# Patient Record
Sex: FEMALE | Race: WHITE
[De-identification: names, ages, dates, MRNs, and addresses within clinical notes are randomized per-mention and may not be internally consistent; named-entity substitution may affect disease eponyms.]

---

## 2020-08-01 ENCOUNTER — HOSPITAL ENCOUNTER (INPATIENT)
Dept: HOSPITAL 54 - ER | Age: 65
LOS: 2 days | Discharge: HOME | DRG: 198 | End: 2020-08-03
Attending: INTERNAL MEDICINE
Payer: MEDICAID

## 2020-08-01 VITALS — SYSTOLIC BLOOD PRESSURE: 129 MMHG | DIASTOLIC BLOOD PRESSURE: 61 MMHG

## 2020-08-01 VITALS — HEIGHT: 60.5 IN | BODY MASS INDEX: 38.83 KG/M2 | WEIGHT: 203 LBS

## 2020-08-01 VITALS — DIASTOLIC BLOOD PRESSURE: 61 MMHG | SYSTOLIC BLOOD PRESSURE: 129 MMHG

## 2020-08-01 DIAGNOSIS — R53.1: ICD-10-CM

## 2020-08-01 DIAGNOSIS — N39.0: ICD-10-CM

## 2020-08-01 DIAGNOSIS — K11.7: ICD-10-CM

## 2020-08-01 DIAGNOSIS — Z87.11: ICD-10-CM

## 2020-08-01 DIAGNOSIS — J45.909: ICD-10-CM

## 2020-08-01 DIAGNOSIS — K21.9: ICD-10-CM

## 2020-08-01 DIAGNOSIS — G40.909: ICD-10-CM

## 2020-08-01 DIAGNOSIS — Z87.820: ICD-10-CM

## 2020-08-01 DIAGNOSIS — F41.9: ICD-10-CM

## 2020-08-01 DIAGNOSIS — G89.29: ICD-10-CM

## 2020-08-01 DIAGNOSIS — Z95.5: ICD-10-CM

## 2020-08-01 DIAGNOSIS — R07.89: Primary | ICD-10-CM

## 2020-08-01 DIAGNOSIS — Z79.899: ICD-10-CM

## 2020-08-01 DIAGNOSIS — E66.9: ICD-10-CM

## 2020-08-01 DIAGNOSIS — E78.5: ICD-10-CM

## 2020-08-01 DIAGNOSIS — M32.9: ICD-10-CM

## 2020-08-01 DIAGNOSIS — M79.7: ICD-10-CM

## 2020-08-01 DIAGNOSIS — F32.9: ICD-10-CM

## 2020-08-01 DIAGNOSIS — D64.9: ICD-10-CM

## 2020-08-01 DIAGNOSIS — I25.10: ICD-10-CM

## 2020-08-01 LAB
ALBUMIN SERPL BCP-MCNC: 3.4 G/DL (ref 3.4–5)
ALP SERPL-CCNC: 110 U/L (ref 46–116)
ALT SERPL W P-5'-P-CCNC: 21 U/L (ref 12–78)
APPEARANCE UR: (no result)
AST SERPL W P-5'-P-CCNC: 15 U/L (ref 15–37)
BASOPHILS # BLD AUTO: 0 /CMM (ref 0–0.2)
BASOPHILS NFR BLD AUTO: 0.4 % (ref 0–2)
BILIRUB DIRECT SERPL-MCNC: 0.1 MG/DL (ref 0–0.2)
BILIRUB SERPL-MCNC: 0.4 MG/DL (ref 0.2–1)
BUN SERPL-MCNC: 13 MG/DL (ref 7–18)
CALCIUM SERPL-MCNC: 9.5 MG/DL (ref 8.5–10.1)
CHLORIDE SERPL-SCNC: 103 MMOL/L (ref 98–107)
CO2 SERPL-SCNC: 29 MMOL/L (ref 21–32)
CREAT SERPL-MCNC: 1.1 MG/DL (ref 0.6–1.3)
EOSINOPHIL NFR BLD AUTO: 1 % (ref 0–6)
GLUCOSE SERPL-MCNC: 91 MG/DL (ref 74–106)
HCT VFR BLD AUTO: 38 % (ref 33–45)
HGB BLD-MCNC: 12.4 G/DL (ref 11.5–14.8)
LIPASE SERPL-CCNC: 84 U/L (ref 73–393)
LYMPHOCYTES NFR BLD AUTO: 2.5 /CMM (ref 0.8–4.8)
LYMPHOCYTES NFR BLD AUTO: 22.1 % (ref 20–44)
MCHC RBC AUTO-ENTMCNC: 33 G/DL (ref 31–36)
MCV RBC AUTO: 91 FL (ref 82–100)
MONOCYTES NFR BLD AUTO: 0.8 /CMM (ref 0.1–1.3)
MONOCYTES NFR BLD AUTO: 7.4 % (ref 2–12)
NEUTROPHILS # BLD AUTO: 7.9 /CMM (ref 1.8–8.9)
NEUTROPHILS NFR BLD AUTO: 69.1 % (ref 43–81)
PH UR STRIP: 5 [PH] (ref 5–8)
PLATELET # BLD AUTO: 263 /CMM (ref 150–450)
POTASSIUM SERPL-SCNC: 3.8 MMOL/L (ref 3.5–5.1)
PROT SERPL-MCNC: 7.2 G/DL (ref 6.4–8.2)
RBC # BLD AUTO: 4.12 MIL/UL (ref 4–5.2)
RBC #/AREA URNS HPF: (no result) /HPF (ref 0–2)
SODIUM SERPL-SCNC: 140 MMOL/L (ref 136–145)
UROBILINOGEN UR STRIP-MCNC: 0.2 EU/DL
WBC #/AREA URNS HPF: (no result) /HPF (ref 0–3)
WBC NRBC COR # BLD AUTO: 11.4 K/UL (ref 4.3–11)

## 2020-08-01 PROCEDURE — G0378 HOSPITAL OBSERVATION PER HR: HCPCS

## 2020-08-01 NOTE — NUR
BIBS FROM HOME TO ER BED 12. AAOX4. NOT IN RESP DISTRESS, AMBUALTORY. CAME IN 
FOR L CHEST PAIN SINCE TUESDAY AND NAUSEA W/ VOMMITING SINCE MONDAY. PER PT, 
SHE MIGHT HAVE HAD FOOD POISONING LAST MONDAY AND HAVE BEEN NAUSEOUS AND 
VOMMITING. PT ALSO VERBALIZED THAT SHE HAD CHEST PAIN SINCE TUESDAY 4/10 [place] 
NON RADIATING. MICHI GIBSON WAS AT THE BEDSIDE FOR EVAL. ORDERS RECEIVED NOTED 
AND CARRIED OUT. IV LINE ON R HAND 20GH. BLOO DRAWNAND SENT TO LAB.

## 2020-08-01 NOTE — NUR
PT WAS TRANSPORTED TO UNIT ON [facility] WITH EMT AND RN AT BEDSIDE W/ ACLS 
PROTOCOL. NAD NOTED DURING TRANSPORT. PT AMBULATED WITH SBA USING A CANE.

## 2020-08-01 NOTE — NUR
PATIENT ARRIVED FROM ER, AWAKE, A/O X4. NO SOB NOTED. NO COMPLAIN OF CHEST PAIN. NO NAUSEA 
OR VOMITTING. CALL LIGHT WIHTIN REACH. BED IN LOWEST AND LOCKED POSITION. INSTRUCTED TO CALL 
FOR ASSISTANCE WHEN OOB, PATIENT VERBALIZED UNDERSTANDING. WITH RIGHT HAND IV INTACT. 
INFORMED MD - DR GOTTLIEB OF ADMISSION, WAITING FOR THE RESPONSE.

## 2020-08-02 VITALS — DIASTOLIC BLOOD PRESSURE: 81 MMHG | SYSTOLIC BLOOD PRESSURE: 118 MMHG

## 2020-08-02 VITALS — DIASTOLIC BLOOD PRESSURE: 58 MMHG | SYSTOLIC BLOOD PRESSURE: 120 MMHG

## 2020-08-02 VITALS — DIASTOLIC BLOOD PRESSURE: 70 MMHG | SYSTOLIC BLOOD PRESSURE: 134 MMHG

## 2020-08-02 VITALS — SYSTOLIC BLOOD PRESSURE: 120 MMHG | DIASTOLIC BLOOD PRESSURE: 58 MMHG

## 2020-08-02 VITALS — DIASTOLIC BLOOD PRESSURE: 72 MMHG | SYSTOLIC BLOOD PRESSURE: 144 MMHG

## 2020-08-02 LAB
ALBUMIN SERPL BCP-MCNC: 3 G/DL (ref 3.4–5)
ALP SERPL-CCNC: 88 U/L (ref 46–116)
ALT SERPL W P-5'-P-CCNC: 19 U/L (ref 12–78)
AST SERPL W P-5'-P-CCNC: 13 U/L (ref 15–37)
BASOPHILS # BLD AUTO: 0 /CMM (ref 0–0.2)
BASOPHILS NFR BLD AUTO: 0.3 % (ref 0–2)
BILIRUB SERPL-MCNC: 0.3 MG/DL (ref 0.2–1)
BUN SERPL-MCNC: 13 MG/DL (ref 7–18)
CALCIUM SERPL-MCNC: 8.1 MG/DL (ref 8.5–10.1)
CHLORIDE SERPL-SCNC: 103 MMOL/L (ref 98–107)
CHOLEST SERPL-MCNC: 110 MG/DL (ref ?–200)
CO2 SERPL-SCNC: 29 MMOL/L (ref 21–32)
CREAT SERPL-MCNC: 1 MG/DL (ref 0.6–1.3)
EOSINOPHIL NFR BLD AUTO: 2 % (ref 0–6)
FERRITIN SERPL-MCNC: 26 NG/ML (ref 8–388)
GLUCOSE SERPL-MCNC: 128 MG/DL (ref 74–106)
HCT VFR BLD AUTO: 36 % (ref 33–45)
HDLC SERPL-MCNC: 47 MG/DL (ref 40–60)
HGB BLD-MCNC: 11.8 G/DL (ref 11.5–14.8)
IRON SERPL-MCNC: 47 UG/DL (ref 50–175)
LDLC SERPL DIRECT ASSAY-MCNC: 41 MG/DL (ref 0–99)
LYMPHOCYTES NFR BLD AUTO: 29.8 % (ref 20–44)
LYMPHOCYTES NFR BLD AUTO: 3.1 /CMM (ref 0.8–4.8)
MAGNESIUM SERPL-MCNC: 2.3 MG/DL (ref 1.8–2.4)
MCHC RBC AUTO-ENTMCNC: 33 G/DL (ref 31–36)
MCV RBC AUTO: 92 FL (ref 82–100)
MONOCYTES NFR BLD AUTO: 0.7 /CMM (ref 0.1–1.3)
MONOCYTES NFR BLD AUTO: 6.9 % (ref 2–12)
NEUTROPHILS # BLD AUTO: 6.3 /CMM (ref 1.8–8.9)
NEUTROPHILS NFR BLD AUTO: 61 % (ref 43–81)
PHOSPHATE SERPL-MCNC: 4.6 MG/DL (ref 2.5–4.9)
PLATELET # BLD AUTO: 246 /CMM (ref 150–450)
POTASSIUM SERPL-SCNC: 3.5 MMOL/L (ref 3.5–5.1)
PROT SERPL-MCNC: 6.5 G/DL (ref 6.4–8.2)
RBC # BLD AUTO: 3.88 MIL/UL (ref 4–5.2)
SODIUM SERPL-SCNC: 141 MMOL/L (ref 136–145)
TIBC SERPL-MCNC: 267 UG/DL (ref 250–450)
TRIGL SERPL-MCNC: 128 MG/DL (ref 30–150)
TSH SERPL DL<=0.005 MIU/L-ACNC: 3.15 UIU/ML (ref 0.36–3.74)
WBC NRBC COR # BLD AUTO: 10.3 K/UL (ref 4.3–11)

## 2020-08-02 RX ADMIN — Medication SCH MG: at 09:05

## 2020-08-02 RX ADMIN — GABAPENTIN SCH MG: 400 CAPSULE ORAL at 13:00

## 2020-08-02 RX ADMIN — Medication SCH MG: at 09:13

## 2020-08-02 RX ADMIN — GABAPENTIN SCH MG: 400 CAPSULE ORAL at 09:05

## 2020-08-02 RX ADMIN — ENOXAPARIN SODIUM SCH MG: 40 INJECTION SUBCUTANEOUS at 09:14

## 2020-08-02 RX ADMIN — DEXTROSE MONOHYDRATE SCH MLS/HR: 50 INJECTION, SOLUTION INTRAVENOUS at 08:50

## 2020-08-02 RX ADMIN — CELECOXIB SCH MG: 100 CAPSULE ORAL at 09:05

## 2020-08-02 RX ADMIN — GABAPENTIN SCH MG: 400 CAPSULE ORAL at 16:08

## 2020-08-02 RX ADMIN — RANOLAZINE SCH MG: 500 TABLET, FILM COATED, EXTENDED RELEASE ORAL at 09:13

## 2020-08-02 RX ADMIN — PANTOPRAZOLE SODIUM SCH MG: 40 TABLET, DELAYED RELEASE ORAL at 08:17

## 2020-08-02 RX ADMIN — DULOXETINE HYDROCHLORIDE SCH MG: 30 CAPSULE, DELAYED RELEASE ORAL at 09:07

## 2020-08-02 NOTE — NUR
RN CLOSING NOTES

WILL ENDORSE PATIENT TO PM NURSE. PATIENT  IN BED, A/O X 4, ON ROOM AIR,,NO S/S OF 
RESPIRATORY DISTRESS. IV ACCESS RIGHT HAND # 20 SL AND LEFT WRIST # 18, PATIENT AND FLUSHED. 
PATIENT WAITING ON CT OF HEART WILL BE NPO AFTER MIDNIGHT.  SAFETY MEASURES IN PLACE, BED 
LOCKED AND IN LOWEST POSITION, SIDE RAILS UP X 2. WILL CONTINUE TO MONITOR.

## 2020-08-02 NOTE — NUR
RN NOTES,

PATIENT A/O X 4 , SHE GAVE A VERBAL OKAY FOR RN TO SPEAK TO  FAMILY MEMBER (SHIV) 
REGARDING HER STATUS.

## 2020-08-02 NOTE — NUR
RN NOTES

0820 - AMADOU FROM RADIOLOGY CALLED REGARDING A CT OF THE HEART. TO KEEP PT NPO , PER 
AMADOU CT WILL BE DONE AROUND 1400. 

1145- OBTAINED A SIGNED CONSENT FROM PATIENT AND IV  l WRIST # 18 BY FREDO PEDRO.

## 2020-08-02 NOTE — NUR
FOLLOWED UP WITH RADIOLOGY REGARDING CT OF HEART, PER RADIOLOGY THEY NEED A NURSE. CALLED 
NURSING SUPERVISOR TO CONFIRM, PER NS OKAY FOR PT TO HAVE DINNER AND RETURN NPO AFTER 
MIDNIGHT, CT OF HEART WILL NOT BE DONE TODAY.

## 2020-08-02 NOTE — NUR
RN NOTES

JONATHAN CLINE ENDORSED  A MESSAGE FROM PATIENT. ADD CONTACT: GABRIELE GROVE [phone]. WILL 
ALSO ADD IN FACE-SHEET.

## 2020-08-02 NOTE — NUR
RN OPENING NOTES

RECEIVED PATIENT IN BED, A/O X 4, ON ROOM AIR,,NO S/S OF RESPIRATORY DISTRESS. NOTED IV 
ACCESS RIGHT HAND # 20 SL. PATIENT WAITING ON CT OF HEART WILL BE NPO. SAFETY MEASURES IN 
PLACE, BED LOCKED AND IN LOWEST POSITION, SIDE RAILS UP X 2. WILL CONTINUE TO MONITOR.

## 2020-08-02 NOTE — NUR
MS RN OPENING NOTES: RECEIVED PATIENT IN BED, AWAKE,A/O X4. NO SOB NOTED. NO COMPLAIN OF 
PAIN. CALL LIGHT WITHIN REACH. BED ALARM TURNED ON. BED IN LOWEST AND LOCKED POSITION. SCD's 
PLACED BACK TO THE LEGS. NPO POST MN PATIENT IS AWARE,VERBALIZED UNDERSTANDING. CNA AWARE.

## 2020-08-02 NOTE — NUR
TELE RN CLOSING NOTES: PATIENT WALKED TO THE BATHROOM WITH CANE, STEADY. VOIDED. WENT BACK 
TO THE BED.CALL LIGHT WITHIN REACH. BED ALARM ON. BED IN LOWEST AND LOCKED POSITION. PATIENT 
DENIES ANY NAUSEA, VOMITTING. PATIENT STATED THAT SHE IS STILL HAVING VERY SLIGHT CHEST 
PAIN, IT'S BETTER  THAN IT WAS. SCD's ON BOTH LEGS ON.VITALS STABLE. AFEBRILE.

## 2020-08-03 VITALS — SYSTOLIC BLOOD PRESSURE: 138 MMHG | DIASTOLIC BLOOD PRESSURE: 62 MMHG

## 2020-08-03 VITALS — SYSTOLIC BLOOD PRESSURE: 137 MMHG | DIASTOLIC BLOOD PRESSURE: 65 MMHG

## 2020-08-03 VITALS — SYSTOLIC BLOOD PRESSURE: 132 MMHG | DIASTOLIC BLOOD PRESSURE: 60 MMHG

## 2020-08-03 LAB
BASOPHILS # BLD AUTO: 0 /CMM (ref 0–0.2)
BASOPHILS NFR BLD AUTO: 0.2 % (ref 0–2)
BUN SERPL-MCNC: 16 MG/DL (ref 7–18)
CALCIUM SERPL-MCNC: 9 MG/DL (ref 8.5–10.1)
CHLORIDE SERPL-SCNC: 102 MMOL/L (ref 98–107)
CO2 SERPL-SCNC: 29 MMOL/L (ref 21–32)
CREAT SERPL-MCNC: 1.1 MG/DL (ref 0.6–1.3)
EOSINOPHIL NFR BLD AUTO: 0 % (ref 0–6)
GLUCOSE SERPL-MCNC: 181 MG/DL (ref 74–106)
HCT VFR BLD AUTO: 40 % (ref 33–45)
HGB BLD-MCNC: 13.1 G/DL (ref 11.5–14.8)
LYMPHOCYTES NFR BLD AUTO: 1.2 /CMM (ref 0.8–4.8)
LYMPHOCYTES NFR BLD AUTO: 8.9 % (ref 20–44)
MAGNESIUM SERPL-MCNC: 2.5 MG/DL (ref 1.8–2.4)
MCHC RBC AUTO-ENTMCNC: 33 G/DL (ref 31–36)
MCV RBC AUTO: 92 FL (ref 82–100)
MONOCYTES NFR BLD AUTO: 0.4 /CMM (ref 0.1–1.3)
MONOCYTES NFR BLD AUTO: 3.2 % (ref 2–12)
NEUTROPHILS # BLD AUTO: 11.5 /CMM (ref 1.8–8.9)
NEUTROPHILS NFR BLD AUTO: 87.7 % (ref 43–81)
PHOSPHATE SERPL-MCNC: 4.3 MG/DL (ref 2.5–4.9)
PLATELET # BLD AUTO: 277 /CMM (ref 150–450)
POTASSIUM SERPL-SCNC: 3.9 MMOL/L (ref 3.5–5.1)
RBC # BLD AUTO: 4.38 MIL/UL (ref 4–5.2)
SODIUM SERPL-SCNC: 141 MMOL/L (ref 136–145)
WBC NRBC COR # BLD AUTO: 13.1 K/UL (ref 4.3–11)

## 2020-08-03 RX ADMIN — DULOXETINE HYDROCHLORIDE SCH MG: 30 CAPSULE, DELAYED RELEASE ORAL at 12:44

## 2020-08-03 RX ADMIN — GABAPENTIN SCH MG: 400 CAPSULE ORAL at 12:05

## 2020-08-03 RX ADMIN — Medication SCH MG: at 12:44

## 2020-08-03 RX ADMIN — DEXTROSE MONOHYDRATE SCH MLS/HR: 50 INJECTION, SOLUTION INTRAVENOUS at 13:06

## 2020-08-03 RX ADMIN — GABAPENTIN SCH MG: 400 CAPSULE ORAL at 17:00

## 2020-08-03 RX ADMIN — ENOXAPARIN SODIUM SCH MG: 40 INJECTION SUBCUTANEOUS at 09:00

## 2020-08-03 RX ADMIN — Medication SCH MG: at 13:01

## 2020-08-03 RX ADMIN — PANTOPRAZOLE SODIUM SCH MG: 40 TABLET, DELAYED RELEASE ORAL at 12:06

## 2020-08-03 RX ADMIN — RANOLAZINE SCH MG: 500 TABLET, FILM COATED, EXTENDED RELEASE ORAL at 12:57

## 2020-08-03 RX ADMIN — CELECOXIB SCH MG: 100 CAPSULE ORAL at 12:06

## 2020-08-03 RX ADMIN — Medication SCH MG: at 09:00

## 2020-08-03 RX ADMIN — GABAPENTIN SCH MG: 400 CAPSULE ORAL at 15:15

## 2020-08-03 NOTE — NUR
DID NOT ADMINISTERED PREDNISONE D/T GETTING SOLU-MEDROL IVP ONE TIME BEFORE GOING TO CT 
ANGIO FOR ANAPHYLAXIS.

## 2020-08-03 NOTE — NUR
PATIENT VITAL SIGNS ARE STABLE, PATIENT COMPLAINTS OF NO PAIN AND HAS NO SIGNS OF SOB AND NO 
SIGNS OF DISTRESS. PATIENT DISCHARGE INSTRUCTIONS WERE EXPLAINED VERBALLY, TO CONTINUE WITH 
PRESCRIBED MEDICATIONS, FOLLOW UP WITH PRIMARY CARE DOCTOR AND RETURN TO THE ER IN CASE OF 
AN EMERGENCY. SHE VERBALLY READ INSTRUCTIONS BACK. IV'S WERE REMOVED WITH NO SIGNS OF 
BLEEDING AND COVERED. BELONGING LIST WAS COMPLETED AND SIGNED. LEFT THE HOSPITAL VIA 
WHEELCHAIR BY JONATHAN CARRANZA AND GOT PICKED UP BY A PRIVATE CAR.

## 2020-08-03 NOTE — NUR
MS RN CLOSING NOTES: PATIENT IN BED, AWAKE A/O X4. PATIENT IS COMFORTABLE AT THIS TIME. NO 
C/O CHEST PAIN. CALL LIGHT WITHIN REACH. BED ALARM ON. BED IN LOWEST AND LOCKED POSITION. 
WITH O2 AT 3L/MIN NC. HOB ELEVATED AT 30 DEGREES. NPO.

## 2020-08-03 NOTE — NUR
PATIENT STATES THE CHEST PAIN WENT AWAY. PATIENT IN BED, HOB ELEVATED. WITH O2 AT 3L/MIN 
NASAL CANNULA. PATIENT IS COMFORTABLE.

## 2020-08-03 NOTE — NUR
PATIENT COMPLAINED OF CHEST PAIN/TIGHTNESS, V/S TAKEN AND RECORDED, WNL. PATIENT COMPLAINED 
OF SHORTNESS OF BREATH, OXYGEN AT 3L/MIN. GIVEN, INFORMED DR GOTTLIEB. CHARGE NURSE MADE AWARE.

## 2020-08-03 NOTE — NUR
PATIENT STATES THAT SHE TOOK HER OWN MED- NITROGLYCERINE 0.4MG 1 TAB SL AND ALBUTEROL 2 
PUFFS INH. ADVISED PATIENT NOT TO TAKE OWN MEDS., PATIENT VERBALIZED UNDERSTANDING.

## 2020-08-03 NOTE — NUR
RN NOTES:

CTA procedure:

Patient verbalizes understanding regarding the procedure, patient signed consent. IV g 18 
inserted at right AC with good back flow of blood. Patient tolerated the CTA, denies any 
discomfort or pain at this time. no signs of any adverse reaction noted. Patient is stable 
to be transfer back to her room, report given to Deanne Nelson RN.

## 2020-08-03 NOTE — NUR
RN MS DIAZ NOTES

PATIENT IS AWAKE AND ALERT A/O X 4. WITH NO SIGNS OF DISTRESS IN ROOM AIR. IV ON THE R HAND 
#20G INTACT SL AND IV L WRIST #18G INTACT SL. COMPLAINS OF NO PAIN AT THIS MOMENT. BED IS IN 
LOW POSITION WITH SIDE RAILS UP X 2 FOR SAFETY. CALL LIGHT WITHIN REACH. WILL CONTINUE TO 
MONITOR.

## 2021-03-12 ENCOUNTER — HOSPITAL ENCOUNTER (INPATIENT)
Dept: HOSPITAL 54 - ER | Age: 66
LOS: 1 days | Discharge: HOME | DRG: 198 | End: 2021-03-13
Attending: INTERNAL MEDICINE | Admitting: INTERNAL MEDICINE
Payer: MEDICAID

## 2021-03-12 VITALS — WEIGHT: 200 LBS | BODY MASS INDEX: 33.32 KG/M2 | HEIGHT: 65 IN

## 2021-03-12 VITALS — SYSTOLIC BLOOD PRESSURE: 101 MMHG | DIASTOLIC BLOOD PRESSURE: 47 MMHG

## 2021-03-12 DIAGNOSIS — E66.9: ICD-10-CM

## 2021-03-12 DIAGNOSIS — Z20.822: ICD-10-CM

## 2021-03-12 DIAGNOSIS — I25.10: ICD-10-CM

## 2021-03-12 DIAGNOSIS — M79.7: ICD-10-CM

## 2021-03-12 DIAGNOSIS — Z87.11: ICD-10-CM

## 2021-03-12 DIAGNOSIS — J45.909: ICD-10-CM

## 2021-03-12 DIAGNOSIS — E78.5: ICD-10-CM

## 2021-03-12 DIAGNOSIS — M32.9: ICD-10-CM

## 2021-03-12 DIAGNOSIS — R07.89: Primary | ICD-10-CM

## 2021-03-12 DIAGNOSIS — F41.9: ICD-10-CM

## 2021-03-12 LAB
ALBUMIN SERPL BCP-MCNC: 3.2 G/DL (ref 3.4–5)
ALP SERPL-CCNC: 108 U/L (ref 46–116)
ALT SERPL W P-5'-P-CCNC: 39 U/L (ref 12–78)
AST SERPL W P-5'-P-CCNC: 22 U/L (ref 15–37)
BASOPHILS # BLD AUTO: 0.2 /CMM (ref 0–0.2)
BASOPHILS NFR BLD AUTO: 1.2 % (ref 0–2)
BILIRUB DIRECT SERPL-MCNC: 0.1 MG/DL (ref 0–0.2)
BILIRUB SERPL-MCNC: 0.2 MG/DL (ref 0.2–1)
BUN SERPL-MCNC: 14 MG/DL (ref 7–18)
CALCIUM SERPL-MCNC: 8.5 MG/DL (ref 8.5–10.1)
CHLORIDE SERPL-SCNC: 105 MMOL/L (ref 98–107)
CO2 SERPL-SCNC: 30 MMOL/L (ref 21–32)
CREAT SERPL-MCNC: 1.1 MG/DL (ref 0.6–1.3)
EOSINOPHIL NFR BLD AUTO: 1 % (ref 0–6)
GLUCOSE SERPL-MCNC: 101 MG/DL (ref 74–106)
HCT VFR BLD AUTO: 37 % (ref 33–45)
HGB BLD-MCNC: 12.2 G/DL (ref 11.5–14.8)
LIPASE SERPL-CCNC: 80 U/L (ref 73–393)
LYMPHOCYTES NFR BLD AUTO: 19.6 % (ref 20–44)
LYMPHOCYTES NFR BLD AUTO: 2.7 /CMM (ref 0.8–4.8)
LYMPHOCYTES NFR BLD MANUAL: 29 % (ref 16–48)
MCHC RBC AUTO-ENTMCNC: 33 G/DL (ref 31–36)
MCV RBC AUTO: 89 FL (ref 82–100)
MONOCYTES NFR BLD AUTO: 1.1 /CMM (ref 0.1–1.3)
MONOCYTES NFR BLD AUTO: 7.8 % (ref 2–12)
MONOCYTES NFR BLD MANUAL: 4 % (ref 0–11)
NEUTROPHILS # BLD AUTO: 9.9 /CMM (ref 1.8–8.9)
NEUTROPHILS NFR BLD AUTO: 70.4 % (ref 43–81)
NEUTS SEG NFR BLD MANUAL: 67 % (ref 42–76)
PLATELET # BLD AUTO: 267 /CMM (ref 150–450)
POTASSIUM SERPL-SCNC: 3.6 MMOL/L (ref 3.5–5.1)
PROT SERPL-MCNC: 7 G/DL (ref 6.4–8.2)
RBC # BLD AUTO: 4.2 MIL/UL (ref 4–5.2)
SODIUM SERPL-SCNC: 145 MMOL/L (ref 136–145)
WBC NRBC COR # BLD AUTO: 14 K/UL (ref 4.3–11)

## 2021-03-12 PROCEDURE — G0378 HOSPITAL OBSERVATION PER HR: HCPCS

## 2021-03-12 PROCEDURE — C9803 HOPD COVID-19 SPEC COLLECT: HCPCS

## 2021-03-12 PROCEDURE — C9113 INJ PANTOPRAZOLE SODIUM, VIA: HCPCS

## 2021-03-12 RX ADMIN — GABAPENTIN SCH MG: 300 CAPSULE ORAL at 18:20

## 2021-03-12 RX ADMIN — LEVETIRACETAM SCH MG: 250 TABLET, FILM COATED ORAL at 18:20

## 2021-03-12 NOTE — NUR
CTA DONE, PATIENT TOLERATED PROCEDURE WELL. BENADRYL GIVEN X1 PROPHYLACTIC. 
ENDORSED TO INPATIENT RN. NEEDS ATTENDED.

## 2021-03-12 NOTE — NUR
FROM [facility] ADMIT UNDER DR. MORALES [phone] PER ANNA JACKSON

## 2021-03-12 NOTE — NUR
RN NOTE



PT RECEIVED IN BED, A/O X4, ON RA SATING 98%, HAS UNLABORED BRATHING, DENIES ANY PAIN ON 
TELE MONITOR SHOWING SR WITH HR IN 60s. SAFETY MEASURES IN PLACE.

## 2021-03-12 NOTE — NUR
"Chest Pain x3days on/off, more on than off. Worse now. Took NTG and Asa. 
Patient a/ox4, breathing even and unlabored, no sob noted, needs attended, 
changed into a gown, attached to the cardiac monitor. No distress noted.

## 2021-03-13 VITALS — SYSTOLIC BLOOD PRESSURE: 136 MMHG | DIASTOLIC BLOOD PRESSURE: 62 MMHG

## 2021-03-13 VITALS — DIASTOLIC BLOOD PRESSURE: 67 MMHG | SYSTOLIC BLOOD PRESSURE: 141 MMHG

## 2021-03-13 VITALS — SYSTOLIC BLOOD PRESSURE: 130 MMHG | DIASTOLIC BLOOD PRESSURE: 42 MMHG

## 2021-03-13 VITALS — SYSTOLIC BLOOD PRESSURE: 120 MMHG | DIASTOLIC BLOOD PRESSURE: 48 MMHG

## 2021-03-13 LAB
BASOPHILS # BLD AUTO: 0 /CMM (ref 0–0.2)
BASOPHILS NFR BLD AUTO: 0.2 % (ref 0–2)
BUN SERPL-MCNC: 13 MG/DL (ref 7–18)
CALCIUM SERPL-MCNC: 8.7 MG/DL (ref 8.5–10.1)
CHLORIDE SERPL-SCNC: 104 MMOL/L (ref 98–107)
CO2 SERPL-SCNC: 29 MMOL/L (ref 21–32)
CREAT SERPL-MCNC: 1.2 MG/DL (ref 0.6–1.3)
EOSINOPHIL NFR BLD AUTO: 0.8 % (ref 0–6)
GLUCOSE SERPL-MCNC: 109 MG/DL (ref 74–106)
HCT VFR BLD AUTO: 35 % (ref 33–45)
HGB BLD-MCNC: 11.3 G/DL (ref 11.5–14.8)
LYMPHOCYTES NFR BLD AUTO: 27.1 % (ref 20–44)
LYMPHOCYTES NFR BLD AUTO: 3.8 /CMM (ref 0.8–4.8)
MCHC RBC AUTO-ENTMCNC: 33 G/DL (ref 31–36)
MCV RBC AUTO: 89 FL (ref 82–100)
MONOCYTES NFR BLD AUTO: 0.8 /CMM (ref 0.1–1.3)
MONOCYTES NFR BLD AUTO: 5.8 % (ref 2–12)
NEUTROPHILS # BLD AUTO: 9.3 /CMM (ref 1.8–8.9)
NEUTROPHILS NFR BLD AUTO: 66.1 % (ref 43–81)
PLATELET # BLD AUTO: 239 /CMM (ref 150–450)
POTASSIUM SERPL-SCNC: 4.2 MMOL/L (ref 3.5–5.1)
RBC # BLD AUTO: 3.86 MIL/UL (ref 4–5.2)
SODIUM SERPL-SCNC: 141 MMOL/L (ref 136–145)
WBC NRBC COR # BLD AUTO: 14.1 K/UL (ref 4.3–11)

## 2021-03-13 RX ADMIN — GABAPENTIN SCH MG: 300 CAPSULE ORAL at 09:10

## 2021-03-13 RX ADMIN — LEVETIRACETAM SCH MG: 250 TABLET, FILM COATED ORAL at 09:10

## 2021-03-13 RX ADMIN — GABAPENTIN SCH MG: 300 CAPSULE ORAL at 12:19

## 2021-03-13 NOTE — NUR
RN NOTE



NO ACUTE CHANGES DURING MY SHIFT, PT REMAINED STABLE REPORT GIVEN TO ONCOMING SHIFT FOR KAREN.

## 2021-03-13 NOTE — NUR
RN OPENING NOTES



RECEIVED PT AWAKE, A/O X4. ON RA SATING 98%. NO SOB OR ANY ACUTE RESPIRATORY DISTRESS AT 
THIS TIME. NO PAIN REPORTED. ON TELE MONITOR SHOWING SR. SKIN IS INTACT. IV ACCESS AT RAC 
#20 AND R HAND #20 BOTH INTACT, PATENT AND FLUSHED. SAFETY MEASURES IN PLACE. CALL LIGHT 
WITHIN REACH. BED LOCKED AND AT LOWEST POSITION WITH SIDE RAILS UP X2. WILL CONTINUE TO 
MONITOR.

## 2021-03-13 NOTE — NUR
RN CLOSING NOTES



DISCHARGED PT TO HOME ACCOMPANIED VIA LYFT. STABLE CONDITION. SKIN IS INTACT. NO PAIN 
REPORTED AT THIS TIME. DISCHARGE INSTRUCTIONS GIVEN TO PT. VERBALIZED UNDERSTANDING. 
VACCINATION IS UP TO DATE.

## 2021-11-09 ENCOUNTER — HOSPITAL ENCOUNTER (EMERGENCY)
Dept: HOSPITAL 54 - ER | Age: 66
LOS: 1 days | Discharge: LEFT BEFORE DISCHARGE | End: 2021-11-10
Payer: COMMERCIAL

## 2021-11-09 VITALS — BODY MASS INDEX: 33.49 KG/M2 | HEIGHT: 65 IN | WEIGHT: 201 LBS

## 2021-11-09 DIAGNOSIS — M79.7: ICD-10-CM

## 2021-11-09 DIAGNOSIS — Z53.29: ICD-10-CM

## 2021-11-09 DIAGNOSIS — Z88.0: ICD-10-CM

## 2021-11-09 DIAGNOSIS — Z87.820: ICD-10-CM

## 2021-11-09 DIAGNOSIS — I25.10: ICD-10-CM

## 2021-11-09 DIAGNOSIS — Z91.041: ICD-10-CM

## 2021-11-09 DIAGNOSIS — F32.9: ICD-10-CM

## 2021-11-09 DIAGNOSIS — G40.909: ICD-10-CM

## 2021-11-09 DIAGNOSIS — R07.9: Primary | ICD-10-CM

## 2021-11-09 DIAGNOSIS — Z91.010: ICD-10-CM

## 2021-11-09 DIAGNOSIS — Z79.899: ICD-10-CM

## 2021-11-09 DIAGNOSIS — Z20.822: ICD-10-CM

## 2021-11-09 DIAGNOSIS — Z88.6: ICD-10-CM

## 2021-11-09 LAB
BASOPHILS # BLD AUTO: 0 K/UL (ref 0–0.2)
BASOPHILS NFR BLD AUTO: 0.4 % (ref 0–2)
BUN SERPL-MCNC: 12 MG/DL (ref 7–18)
CALCIUM SERPL-MCNC: 8.8 MG/DL (ref 8.5–10.1)
CHLORIDE SERPL-SCNC: 104 MMOL/L (ref 98–107)
CO2 SERPL-SCNC: 24 MMOL/L (ref 21–32)
CREAT SERPL-MCNC: 1.1 MG/DL (ref 0.6–1.3)
EOSINOPHIL NFR BLD AUTO: 2.6 % (ref 0–6)
GLUCOSE SERPL-MCNC: 140 MG/DL (ref 74–106)
HCT VFR BLD AUTO: 36 % (ref 33–45)
HGB BLD-MCNC: 12.1 G/DL (ref 11.5–14.8)
LYMPHOCYTES NFR BLD AUTO: 2.5 K/UL (ref 0.8–4.8)
LYMPHOCYTES NFR BLD AUTO: 20 % (ref 20–44)
MCHC RBC AUTO-ENTMCNC: 33 G/DL (ref 31–36)
MCV RBC AUTO: 95 FL (ref 82–100)
MONOCYTES NFR BLD AUTO: 0.9 K/UL (ref 0.1–1.3)
MONOCYTES NFR BLD AUTO: 7.6 % (ref 2–12)
NEUTROPHILS # BLD AUTO: 8.6 K/UL (ref 1.8–8.9)
NEUTROPHILS NFR BLD AUTO: 69.4 % (ref 43–81)
PLATELET # BLD AUTO: 261 K/UL (ref 150–450)
POTASSIUM SERPL-SCNC: 4.3 MMOL/L (ref 3.5–5.1)
RBC # BLD AUTO: 3.8 MIL/UL (ref 4–5.2)
SODIUM SERPL-SCNC: 140 MMOL/L (ref 136–145)
WBC NRBC COR # BLD AUTO: 12.5 K/UL (ref 4.3–11)

## 2021-11-09 PROCEDURE — 80048 BASIC METABOLIC PNL TOTAL CA: CPT

## 2021-11-09 PROCEDURE — 84484 ASSAY OF TROPONIN QUANT: CPT

## 2021-11-09 PROCEDURE — 99291 CRITICAL CARE FIRST HOUR: CPT

## 2021-11-09 PROCEDURE — 87081 CULTURE SCREEN ONLY: CPT

## 2021-11-09 PROCEDURE — C9803 HOPD COVID-19 SPEC COLLECT: HCPCS

## 2021-11-09 PROCEDURE — 82550 ASSAY OF CK (CPK): CPT

## 2021-11-09 PROCEDURE — 83880 ASSAY OF NATRIURETIC PEPTIDE: CPT

## 2021-11-09 PROCEDURE — 71045 X-RAY EXAM CHEST 1 VIEW: CPT

## 2021-11-09 PROCEDURE — 85025 COMPLETE CBC W/AUTO DIFF WBC: CPT

## 2021-11-09 PROCEDURE — 87426 SARSCOV CORONAVIRUS AG IA: CPT

## 2021-11-09 PROCEDURE — 36415 COLL VENOUS BLD VENIPUNCTURE: CPT

## 2021-11-09 PROCEDURE — 93005 ELECTROCARDIOGRAM TRACING: CPT

## 2021-11-09 NOTE — NUR
BLANCA FROM URGENT CARE. PT C/O SHARP PAIN SINCE NOON.  MG AND 1 NITRO 
SPRAY WAS GIVEN BY EMS. PT CAME IN WITH A L 20G IV ON R HAND. VITAL SIGNS 
WITHIN NORMAL LIMITS. PT IS A&OX4. EKG WAS TAKEN. DR MOORE AT BESIDE. LAB AT 
BEDSIDE. PT COVID TEST WAS COLLECTED AND SENT TO LAB.

## 2021-11-09 NOTE — NUR
Patient does not wish to proceed with medical care recommended by Dr. Clyde Pond. Patient given information related to possible complications, up to and 
including death, which could occur as a result of leaving the hospital at this 
time. Patient verbalizes understanding of risks involved due to leaving against 
medical advice. Patient has signed AMA form. IV removed. Catheter intact and 
site benign. Pressure and 4x4 applied to site. No bleeding noted.

## 2021-11-10 VITALS — SYSTOLIC BLOOD PRESSURE: 123 MMHG | DIASTOLIC BLOOD PRESSURE: 69 MMHG

## 2023-05-31 ENCOUNTER — HOSPITAL ENCOUNTER (EMERGENCY)
Dept: HOSPITAL 54 - ER | Age: 68
Discharge: HOSPICE | End: 2023-05-31
Payer: COMMERCIAL

## 2023-05-31 VITALS — DIASTOLIC BLOOD PRESSURE: 60 MMHG | SYSTOLIC BLOOD PRESSURE: 130 MMHG

## 2023-05-31 VITALS — HEIGHT: 65 IN | BODY MASS INDEX: 33.49 KG/M2 | WEIGHT: 201 LBS

## 2023-05-31 DIAGNOSIS — M79.7: ICD-10-CM

## 2023-05-31 DIAGNOSIS — R51.9: ICD-10-CM

## 2023-05-31 DIAGNOSIS — I48.91: ICD-10-CM

## 2023-05-31 DIAGNOSIS — Z20.822: ICD-10-CM

## 2023-05-31 DIAGNOSIS — F32.A: ICD-10-CM

## 2023-05-31 DIAGNOSIS — Z88.0: ICD-10-CM

## 2023-05-31 DIAGNOSIS — F41.9: ICD-10-CM

## 2023-05-31 DIAGNOSIS — I50.9: ICD-10-CM

## 2023-05-31 DIAGNOSIS — R53.1: Primary | ICD-10-CM

## 2023-05-31 DIAGNOSIS — Z79.899: ICD-10-CM

## 2023-05-31 DIAGNOSIS — Z90.89: ICD-10-CM

## 2023-05-31 DIAGNOSIS — Z88.8: ICD-10-CM

## 2023-05-31 LAB
ALBUMIN SERPL BCP-MCNC: 3.7 G/DL (ref 3.4–5)
ALP SERPL-CCNC: 174 U/L (ref 46–116)
ALT SERPL W P-5'-P-CCNC: 18 U/L (ref 12–78)
AST SERPL W P-5'-P-CCNC: 11 U/L (ref 15–37)
BASOPHILS # BLD AUTO: 0.1 K/UL (ref 0–0.2)
BASOPHILS NFR BLD AUTO: 0.7 % (ref 0–2)
BILIRUB DIRECT SERPL-MCNC: 0.1 MG/DL (ref 0–0.2)
BILIRUB SERPL-MCNC: 0.3 MG/DL (ref 0.2–1)
BUN SERPL-MCNC: 13 MG/DL (ref 7–18)
CALCIUM SERPL-MCNC: 9.2 MG/DL (ref 8.5–10.1)
CHLORIDE SERPL-SCNC: 107 MMOL/L (ref 98–107)
CO2 SERPL-SCNC: 26 MMOL/L (ref 21–32)
CREAT SERPL-MCNC: 1.1 MG/DL (ref 0.6–1.3)
EOSINOPHIL NFR BLD AUTO: 3.2 % (ref 0–6)
EOSINOPHIL NFR BLD MANUAL: 2 % (ref 0–4)
GLUCOSE SERPL-MCNC: 96 MG/DL (ref 74–106)
HCT VFR BLD AUTO: 37 % (ref 33–45)
HGB BLD-MCNC: 11.2 G/DL (ref 11.5–14.8)
LYMPHOCYTES NFR BLD AUTO: 2.6 K/UL (ref 0.8–4.8)
LYMPHOCYTES NFR BLD AUTO: 21.6 % (ref 20–44)
LYMPHOCYTES NFR BLD MANUAL: 25 % (ref 16–48)
MCHC RBC AUTO-ENTMCNC: 31 G/DL (ref 31–36)
MCV RBC AUTO: 76 FL (ref 82–100)
MONOCYTES NFR BLD AUTO: 0.7 K/UL (ref 0.1–1.3)
MONOCYTES NFR BLD AUTO: 5.8 % (ref 2–12)
MONOCYTES NFR BLD MANUAL: 1 % (ref 0–11)
NEUTROPHILS # BLD AUTO: 8.2 K/UL (ref 1.8–8.9)
NEUTROPHILS NFR BLD AUTO: 68.7 % (ref 43–81)
NEUTS SEG NFR BLD MANUAL: 72 % (ref 42–76)
PLATELET # BLD AUTO: 296 K/UL (ref 150–450)
POTASSIUM SERPL-SCNC: 4 MMOL/L (ref 3.5–5.1)
PROT SERPL-MCNC: 7.8 G/DL (ref 6.4–8.2)
RBC # BLD AUTO: 4.8 MIL/UL (ref 4–5.2)
SODIUM SERPL-SCNC: 142 MMOL/L (ref 136–145)
WBC NRBC COR # BLD AUTO: 11.9 K/UL (ref 4.3–11)

## 2023-05-31 PROCEDURE — 36415 COLL VENOUS BLD VENIPUNCTURE: CPT

## 2023-05-31 PROCEDURE — 96365 THER/PROPH/DIAG IV INF INIT: CPT

## 2023-05-31 PROCEDURE — 93005 ELECTROCARDIOGRAM TRACING: CPT

## 2023-05-31 PROCEDURE — 87426 SARSCOV CORONAVIRUS AG IA: CPT

## 2023-05-31 PROCEDURE — 87040 BLOOD CULTURE FOR BACTERIA: CPT

## 2023-05-31 PROCEDURE — 83605 ASSAY OF LACTIC ACID: CPT

## 2023-05-31 PROCEDURE — 71045 X-RAY EXAM CHEST 1 VIEW: CPT

## 2023-05-31 PROCEDURE — 85007 BL SMEAR W/DIFF WBC COUNT: CPT

## 2023-05-31 PROCEDURE — C9803 HOPD COVID-19 SPEC COLLECT: HCPCS

## 2023-05-31 PROCEDURE — 99291 CRITICAL CARE FIRST HOUR: CPT

## 2023-05-31 PROCEDURE — 70450 CT HEAD/BRAIN W/O DYE: CPT

## 2023-05-31 PROCEDURE — 85025 COMPLETE CBC W/AUTO DIFF WBC: CPT

## 2023-05-31 PROCEDURE — 82962 GLUCOSE BLOOD TEST: CPT

## 2023-05-31 PROCEDURE — 80076 HEPATIC FUNCTION PANEL: CPT

## 2023-05-31 PROCEDURE — 80048 BASIC METABOLIC PNL TOTAL CA: CPT

## 2023-05-31 PROCEDURE — 85730 THROMBOPLASTIN TIME PARTIAL: CPT

## 2023-05-31 PROCEDURE — 87081 CULTURE SCREEN ONLY: CPT

## 2023-05-31 PROCEDURE — 96375 TX/PRO/DX INJ NEW DRUG ADDON: CPT

## 2023-05-31 PROCEDURE — 84484 ASSAY OF TROPONIN QUANT: CPT

## 2023-05-31 NOTE — NUR
PT GOT ACCEPTED AT [facility] ED UNDER CARE OF SHENG ESTEVEZ # FOR 
REPORT: [phone]

PER DR VINSON OK TO TRANSFER VIA BLS. [facility] ETA: 45MIN

## 2023-05-31 NOTE — NUR
-------------------------------------------------------------------------------

          *** Note undone in [facility] - 05/31/23 at 1628 by ASHISH ***           

-------------------------------------------------------------------------------

CODE STROKE

## 2023-05-31 NOTE — NUR
CALLED [facility] MED -[phone] MD WILL BE DR. JAQUAN MIDDLETON.

## 2023-05-31 NOTE — NUR
FROM [facility] CALLED REGARDING PT POSSIBLE TRANSFER TO ANOTHER 
HOSPITAL  SHE WILL CALL BACK FOR CONFIRMATION

## 2025-02-17 ENCOUNTER — HOSPITAL ENCOUNTER (EMERGENCY)
Dept: HOSPITAL 12 - ER | Age: 70
LOS: 1 days | Discharge: HOME | End: 2025-02-18
Payer: MEDICAID

## 2025-02-17 VITALS — WEIGHT: 175 LBS | BODY MASS INDEX: 29.16 KG/M2 | HEIGHT: 65 IN

## 2025-02-17 DIAGNOSIS — R53.1: ICD-10-CM

## 2025-02-17 DIAGNOSIS — R07.9: ICD-10-CM

## 2025-02-17 DIAGNOSIS — F43.0: ICD-10-CM

## 2025-02-17 DIAGNOSIS — Z88.0: ICD-10-CM

## 2025-02-17 DIAGNOSIS — Z88.1: ICD-10-CM

## 2025-02-17 DIAGNOSIS — M79.7: ICD-10-CM

## 2025-02-17 DIAGNOSIS — E11.9: ICD-10-CM

## 2025-02-17 DIAGNOSIS — Z95.0: ICD-10-CM

## 2025-02-17 DIAGNOSIS — Z88.5: ICD-10-CM

## 2025-02-17 DIAGNOSIS — M35.00: ICD-10-CM

## 2025-02-17 DIAGNOSIS — G43.909: Primary | ICD-10-CM

## 2025-02-17 DIAGNOSIS — Z86.73: ICD-10-CM

## 2025-02-17 LAB
ALBUMIN SERPL BCG-MCNC: 3.3 G/DL (ref 3.4–5)
ALP SERPL-CCNC: 109 U/L (ref 50–136)
ALT SERPL W/O P-5'-P-CCNC: 19 U/L (ref 14–59)
AST SERPL-CCNC: 13 U/L (ref 15–37)
BASOPHILS # BLD AUTO: 0 K/UL (ref 0–0.2)
BASOPHILS NFR BLD AUTO: 0.4 % (ref 0–2)
BILIRUB DIRECT SERPL-MCNC: 0.1 MG/DL (ref 0–0.2)
BILIRUB SERPL-MCNC: 0.3 MG/DL (ref 0.2–1)
BUN SERPL-MCNC: 11 MG/DL (ref 7–18)
CALCIUM SERPL-MCNC: 8.6 MG/DL (ref 8.5–10.1)
CHLORIDE SERPL-SCNC: 107 MMOL/L (ref 98–107)
CO2 SERPL-SCNC: 24 MMOL/L (ref 21–32)
CREAT SERPL-MCNC: 1.2 MG/DL (ref 0.6–1.3)
EOSINOPHIL # BLD AUTO: 0.2 K/UL (ref 0–0.7)
EOSINOPHIL NFR BLD AUTO: 1.8 % (ref 0–7)
ERYTHROCYTE [DISTWIDTH] IN BLOOD BY AUTOMATED COUNT: 15.9 % (ref 12.3–17.7)
GLUCOSE SERPL-MCNC: 103 MG/DL (ref 74–106)
HCT VFR BLD AUTO: 37.3 % (ref 31.2–41.9)
HGB BLD-MCNC: 12 G/DL (ref 10.9–14.3)
LYMPHOCYTES # BLD AUTO: 2.7 K/UL (ref 0.8–4.8)
LYMPHOCYTES NFR BLD AUTO: 22.4 % (ref 20.5–51.5)
MANUAL DIF COMMENT BLD-IMP: 1
MCH RBC QN AUTO: 28.1 UUG (ref 24.7–32.8)
MCHC RBC AUTO-ENTMCNC: 32 G/DL (ref 32.3–35.6)
MCV RBC AUTO: 87 FL (ref 75.5–95.3)
MONOCYTES # BLD AUTO: 0.9 K/UL (ref 0.1–1.3)
MONOCYTES NFR BLD AUTO: 7.5 % (ref 0–11)
NEUTROPHILS # BLD AUTO: 8 K/UL (ref 1.8–8.9)
NEUTROPHILS NFR BLD AUTO: 67.9 % (ref 38.5–71.5)
NT-PROBNP SERPL-MCNC: 659 PG/ML (ref 0–125)
PLATELET # BLD AUTO: 293 K/UL (ref 179–408)
POTASSIUM SERPL-SCNC: 3.8 MMOL/L (ref 3.5–5.1)
RBC # BLD AUTO: 4.28 MIL/UL (ref 3.63–4.92)
SODIUM SERPL-SCNC: 140 MMOL/L (ref 136–145)
WBC # BLD AUTO: 11.8 K/UL (ref 3.8–11.8)
WS STN SPEC: 7 G/DL (ref 6.4–8.2)

## 2025-02-17 PROCEDURE — A4663 DIALYSIS BLOOD PRESSURE CUFF: HCPCS

## 2025-02-17 PROCEDURE — A4606 OXYGEN PROBE USED W OXIMETER: HCPCS

## 2025-02-17 RX ADMIN — ALPRAZOLAM ONE MG: 0.25 TABLET ORAL at 23:48

## 2025-02-18 VITALS — SYSTOLIC BLOOD PRESSURE: 130 MMHG | DIASTOLIC BLOOD PRESSURE: 59 MMHG | OXYGEN SATURATION: 98 %

## 2025-02-18 RX ADMIN — Medication ONE MG: at 00:09

## 2025-02-18 RX ADMIN — ONDANSETRON ONE MG: 4 TABLET, ORALLY DISINTEGRATING ORAL at 00:09
